# Patient Record
Sex: FEMALE | URBAN - METROPOLITAN AREA
[De-identification: names, ages, dates, MRNs, and addresses within clinical notes are randomized per-mention and may not be internally consistent; named-entity substitution may affect disease eponyms.]

---

## 2017-07-20 ENCOUNTER — APPOINTMENT (RX ONLY)
Dept: URBAN - METROPOLITAN AREA CLINIC 130 | Facility: CLINIC | Age: 72
Setting detail: DERMATOLOGY
End: 2017-07-20

## 2017-07-20 DIAGNOSIS — L72.0 EPIDERMAL CYST: ICD-10-CM

## 2017-07-20 PROCEDURE — ? TREATMENT REGIMEN

## 2017-07-20 PROCEDURE — ? CONSULTATION EXCISION

## 2017-07-20 PROCEDURE — ? INCISION AND DRAINAGE

## 2017-07-20 PROCEDURE — 10060 I&D ABSCESS SIMPLE/SINGLE: CPT

## 2017-07-20 PROCEDURE — ? PRESCRIPTION

## 2017-07-20 RX ORDER — DOXYCYCLINE 100 MG/1
CAPSULE ORAL
Qty: 20 | Refills: 0 | Status: ERX | COMMUNITY
Start: 2017-07-20

## 2017-07-20 RX ADMIN — DOXYCYCLINE: 100 CAPSULE ORAL at 16:07

## 2017-07-20 ASSESSMENT — LOCATION DETAILED DESCRIPTION DERM
LOCATION DETAILED: SUPERIOR LUMBAR SPINE
LOCATION DETAILED: SUPERIOR LUMBAR SPINE
LOCATION DETAILED: RIGHT INFERIOR MEDIAL UPPER BACK
LOCATION DETAILED: INFERIOR THORACIC SPINE

## 2017-07-20 ASSESSMENT — LOCATION SIMPLE DESCRIPTION DERM
LOCATION SIMPLE: UPPER BACK
LOCATION SIMPLE: RIGHT UPPER BACK
LOCATION SIMPLE: LOWER BACK
LOCATION SIMPLE: LOWER BACK

## 2017-07-20 ASSESSMENT — LOCATION ZONE DERM
LOCATION ZONE: TRUNK
LOCATION ZONE: TRUNK

## 2017-07-20 NOTE — PROCEDURE: INCISION AND DRAINAGE
Epidermal Sutures: 4-0 Ethilon
Consent was obtained and risks were reviewed including but not limited to delayed wound healing, infection, need for multiple I and D's, and pain.
Suture Text: The incision was partially closed with
Method: 15 blade
Lesion Type: Furuncle
Anesthesia Volume In Cc: 0.3
Render Postcare In Note?: No
Dressing: pressure dressing
Epidermal Closure: simple interrupted
Post-Care Instructions: I reviewed with the patient in detail post-care instructions. Patient should keep wound covered and call the office should any redness, pain, swelling or worsening occur.
Body Location Override (Optional - Billing Will Still Be Based On Selected Body Map Location If Applicable): mid back
Preparation Text: The area was prepped in the usual clean fashion.
Size Of Lesion In Cm (Optional But May Be Required For Some Insurances): 0
Curette Text (Optional): After the contents were expressed a curette was used to partially remove the cyst wall.
Anesthesia Type: 1% lidocaine without epinephrine
Detail Level: Detailed